# Patient Record
Sex: FEMALE | Race: WHITE | URBAN - METROPOLITAN AREA
[De-identification: names, ages, dates, MRNs, and addresses within clinical notes are randomized per-mention and may not be internally consistent; named-entity substitution may affect disease eponyms.]

---

## 2023-10-01 ENCOUNTER — HOSPITAL ENCOUNTER (EMERGENCY)
Age: 28
Discharge: HOME | End: 2023-10-02
Attending: EMERGENCY MEDICINE
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
## 2023-10-01 LAB
ABO + RH BLD: NORMAL
ALBUMIN SERPL-MCNC: 3.8 G/DL (ref 3.5–5)
ALBUMIN/GLOB SERPL: 1 (ref 0.4–1.6)
ALP SERPL-CCNC: 54 U/L (ref 50–136)
ALT SERPL-CCNC: 20 U/L (ref 12–65)
ANION GAP SERPL CALC-SCNC: 7 MMOL/L (ref 2–11)
APTT PPP: 34.2 SEC (ref 24.5–34.2)
AST SERPL-CCNC: 18 U/L (ref 15–37)
BASOPHILS # BLD: 0.1 K/UL (ref 0–0.2)
BASOPHILS NFR BLD: 1 % (ref 0–2)
BILIRUB SERPL-MCNC: 0.4 MG/DL (ref 0.2–1.1)
BLOOD GROUP ANTIBODIES SERPL: NORMAL
BUN SERPL-MCNC: 7 MG/DL (ref 6–23)
CALCIUM SERPL-MCNC: 8.9 MG/DL (ref 8.3–10.4)
CHLORIDE SERPL-SCNC: 105 MMOL/L (ref 101–110)
CK SERPL-CCNC: 125 U/L (ref 21–215)
CO2 SERPL-SCNC: 28 MMOL/L (ref 21–32)
CREAT SERPL-MCNC: 0.71 MG/DL (ref 0.6–1)
D DIMER PPP FEU-MCNC: <0.27 UG/ML(FEU)
DIFFERENTIAL METHOD BLD: NORMAL
EOSINOPHIL # BLD: 0.4 K/UL (ref 0–0.8)
EOSINOPHIL NFR BLD: 4 % (ref 0.5–7.8)
ERYTHROCYTE [DISTWIDTH] IN BLOOD BY AUTOMATED COUNT: 13.1 % (ref 11.9–14.6)
FIBRINOGEN PPP-MCNC: 224 MG/DL (ref 190–501)
GLOBULIN SER CALC-MCNC: 3.8 G/DL (ref 2.8–4.5)
GLUCOSE SERPL-MCNC: 121 MG/DL (ref 65–100)
HCT VFR BLD AUTO: 39.8 % (ref 35.8–46.3)
HGB BLD-MCNC: 13 G/DL (ref 11.7–15.4)
IMM GRANULOCYTES # BLD AUTO: 0 K/UL (ref 0–0.5)
IMM GRANULOCYTES NFR BLD AUTO: 0 % (ref 0–5)
INR PPP: 1
LACTATE SERPL-SCNC: 1.5 MMOL/L (ref 0.4–2)
LYMPHOCYTES # BLD: 3.8 K/UL (ref 0.5–4.6)
LYMPHOCYTES NFR BLD: 41 % (ref 13–44)
MCH RBC QN AUTO: 29.2 PG (ref 26.1–32.9)
MCHC RBC AUTO-ENTMCNC: 32.7 G/DL (ref 31.4–35)
MCV RBC AUTO: 89.4 FL (ref 82–102)
MONOCYTES # BLD: 0.8 K/UL (ref 0.1–1.3)
MONOCYTES NFR BLD: 9 % (ref 4–12)
NEUTS SEG # BLD: 4.2 K/UL (ref 1.7–8.2)
NEUTS SEG NFR BLD: 45 % (ref 43–78)
NRBC # BLD: 0 K/UL (ref 0–0.2)
PLATELET # BLD AUTO: 318 K/UL (ref 150–450)
PMV BLD AUTO: 9.9 FL (ref 9.4–12.3)
POTASSIUM SERPL-SCNC: 3 MMOL/L (ref 3.5–5.1)
PROT SERPL-MCNC: 7.6 G/DL (ref 6.3–8.2)
PROTHROMBIN TIME: 13.5 SEC (ref 12.6–14.3)
RBC # BLD AUTO: 4.45 M/UL (ref 4.05–5.2)
SODIUM SERPL-SCNC: 140 MMOL/L (ref 133–143)
SPECIMEN EXP DATE BLD: NORMAL
WBC # BLD AUTO: 9.3 K/UL (ref 4.3–11.1)

## 2023-10-01 PROCEDURE — 86850 RBC ANTIBODY SCREEN: CPT

## 2023-10-01 PROCEDURE — 99283 EMERGENCY DEPT VISIT LOW MDM: CPT

## 2023-10-01 PROCEDURE — 85379 FIBRIN DEGRADATION QUANT: CPT

## 2023-10-01 PROCEDURE — 82550 ASSAY OF CK (CPK): CPT

## 2023-10-01 PROCEDURE — 86901 BLOOD TYPING SEROLOGIC RH(D): CPT

## 2023-10-01 PROCEDURE — 85025 COMPLETE CBC W/AUTO DIFF WBC: CPT

## 2023-10-01 PROCEDURE — 85730 THROMBOPLASTIN TIME PARTIAL: CPT

## 2023-10-01 PROCEDURE — 83605 ASSAY OF LACTIC ACID: CPT

## 2023-10-01 PROCEDURE — 80053 COMPREHEN METABOLIC PANEL: CPT

## 2023-10-01 PROCEDURE — 86900 BLOOD TYPING SEROLOGIC ABO: CPT

## 2023-10-01 PROCEDURE — 85384 FIBRINOGEN ACTIVITY: CPT

## 2023-10-01 PROCEDURE — 85610 PROTHROMBIN TIME: CPT

## 2023-10-01 ASSESSMENT — ENCOUNTER SYMPTOMS
VOMITING: 0
NAUSEA: 0
ABDOMINAL PAIN: 0
BACK PAIN: 0
SHORTNESS OF BREATH: 0

## 2023-10-01 ASSESSMENT — PAIN SCALES - GENERAL: PAINLEVEL_OUTOF10: 0

## 2023-10-01 ASSESSMENT — PAIN - FUNCTIONAL ASSESSMENT: PAIN_FUNCTIONAL_ASSESSMENT: 0-10

## 2023-10-02 VITALS
RESPIRATION RATE: 18 BRPM | HEART RATE: 86 BPM | OXYGEN SATURATION: 100 % | TEMPERATURE: 98.6 F | SYSTOLIC BLOOD PRESSURE: 110 MMHG | WEIGHT: 115 LBS | DIASTOLIC BLOOD PRESSURE: 69 MMHG | HEIGHT: 64 IN | BODY MASS INDEX: 19.63 KG/M2

## 2023-10-02 NOTE — ED NOTES
Snake bite marked and timed with surgical marker for 1945 when pt was bit.      Agata Bailon RN  10/02/23 0030

## 2023-10-02 NOTE — ED TRIAGE NOTES
Pt presents to the ED with a snake bite on her R heel. Pt admits that the snake was a copper head and was a juvenile; pt provided photo for confirmation. Pt admits to slight pain and discomfort, believes snake held down its bite for a decent amount of time. Pt with obvious bite elena and redness to the R heel. Pt denies shortness of breath, chest pain, nausea and vomiting. Breathing even and unlabored, no active signs of distress.

## 2023-10-02 NOTE — ED PROVIDER NOTES
[facility] Emergency Department Provider Note                   PCP:                Pcp No               Age: 33 y.o. Sex: female     MEDICAL DECISION MAKING  Complexity of Problems Addressed:   1 or more acute illness/injury that poses a threat to life or bodily function    Data Reviewed and Analyzed:  Category 1:    I have reviewed outside records from an external source for any pertinent PMH, ED visits, primary care visits, specialist visits, labs, EKG, and/or radiologic studies. Category 2:         I independently ordered and reviewed the labs. There was no radiologic studies ordered. Category 3:     Discussion of management or test interpretation:    MDM  Number of Diagnoses or Management Options  Snake bite, initial encounter  Diagnosis management comments: Patient presents for evaluation of snakebite to her right heel by a copperhead snake. This was a very small and juvenile appearing snake based on the photo she had. Patient's labs including of her coagulation studies came back normal.  Patient was observed for 4 hours and had some minimal localized bruising at the snakebite which did not progress. I do not feel patient had copperhead envenomation and therefore does not need [employer] now. I did offer observing the patient for 4 more hours but she prefers to go home and will return immediately for increased pain, swelling, or any other symptoms. Patient is very reliable and I trust that she can safely monitor her wound. Patient was discharged home with primary care follow-up. Dakotah Starr is a 33 y.o. female who presents to the Emergency Department with chief complaint of    Chief Complaint   Patient presents with    Snake Bite      Patient states at 7:45 PM she was bit by a copperhead snake on her right heel. She states she was outside in the driveway and she felt a stinging sensation to her right heel.   She looked down and there was a small copperhead snake that was

## 2023-10-02 NOTE — ED NOTES
No changes at this time. Pt with no complaints of increased pain.       Harris Garcia RN  10/02/23 [id number]

## 2023-10-02 NOTE — ED NOTES
No visible changes at this time. Pt states that the pain has spread, but isn't extremely tender at this time.       Marizol Simental RN  10/02/23 7124

## 2023-10-02 NOTE — DISCHARGE INSTRUCTIONS
Monitor your snakebite area for the next 4-8 hours and return immediately to the emergency department for increased pain, swelling, paresthesias, or any other concerns.

## 2023-10-02 NOTE — ED NOTES
Ecchymosis present around bite at this time. Pts heel marked up to where the pain is felt, no obvious swelling/redness in the area but pt reports decrease in sensation.       Rui Chen RN  10/02/23 0668

## 2023-10-02 NOTE — ED NOTES
Pt reports a feeling of tightness, but no increased pain upon palpation.       Bessy Mason, RN  10/02/23 3079